# Patient Record
Sex: FEMALE | Race: OTHER | NOT HISPANIC OR LATINO | URBAN - METROPOLITAN AREA
[De-identification: names, ages, dates, MRNs, and addresses within clinical notes are randomized per-mention and may not be internally consistent; named-entity substitution may affect disease eponyms.]

---

## 2024-07-31 ENCOUNTER — EMERGENCY (EMERGENCY)
Facility: HOSPITAL | Age: 59
LOS: 0 days | Discharge: HOME | End: 2024-07-31
Attending: STUDENT IN AN ORGANIZED HEALTH CARE EDUCATION/TRAINING PROGRAM
Payer: COMMERCIAL

## 2024-07-31 VITALS
TEMPERATURE: 98 F | RESPIRATION RATE: 17 BRPM | OXYGEN SATURATION: 97 % | HEART RATE: 91 BPM | DIASTOLIC BLOOD PRESSURE: 102 MMHG | SYSTOLIC BLOOD PRESSURE: 164 MMHG | WEIGHT: 167.55 LBS

## 2024-07-31 DIAGNOSIS — Z04.1 ENCOUNTER FOR EXAMINATION AND OBSERVATION FOLLOWING TRANSPORT ACCIDENT: ICD-10-CM
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
## 2024-07-31 PROCEDURE — 93005 ELECTROCARDIOGRAM TRACING: CPT

## 2024-07-31 PROCEDURE — 93010 ELECTROCARDIOGRAM REPORT: CPT

## 2024-07-31 PROCEDURE — 99283 EMERGENCY DEPT VISIT LOW MDM: CPT | Mod: 25

## 2024-07-31 PROCEDURE — 99283 EMERGENCY DEPT VISIT LOW MDM: CPT
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
## 2024-07-31 NOTE — ED PROVIDER NOTE - PATIENT PORTAL LINK FT
You can access the FollowMyHealth Patient Portal offered by [facility] by registering at the following website: [email or web address]. By joining [facility] FollowMyHealth portal, you will also be able to view your health information using other applications (apps) compatible with our system.

## 2024-07-31 NOTE — ED PROVIDER NOTE - PHYSICAL EXAMINATION
Initial vital signs reviewed.    Airway: patent  Breathing: clear breath sounds bilaterally.  Circulatory: 2+ and equal distal pulses x4.    General: NAD, nontoxic appearing.  HENT: AT/NC. No arzate signs, raccoon eyes, or skull deformity. Oropharynx clear without evidence of trauma.  Eyes: non-injected conjunctivae b/l. PERRLA b/l. EOMI b/l.  Neck: supple. No midline cervical stepoff or tenderness.  CV: RRR, no murmurs.  Pulm: nonlabored work of breathing, CTAB. No chest wall tenderness to palpation or crepitus.  Abd: soft, nondistended, nontender.  MSK: no joint deformity. No tenderness to palpation in extremities x4. No T/L/S midline spinal tenderness or stepoffs.  Skin: warm, dry, well-perfused. No obvious lacerations or active bleeding.  Neuro: A&Ox4.  Psych: appropriate mood and affect.
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
